# Patient Record
Sex: MALE | Employment: OTHER | URBAN - METROPOLITAN AREA
[De-identification: names, ages, dates, MRNs, and addresses within clinical notes are randomized per-mention and may not be internally consistent; named-entity substitution may affect disease eponyms.]

---

## 2024-10-14 ENCOUNTER — OFFICE VISIT (OUTPATIENT)
Dept: URGENT CARE | Age: 74
End: 2024-10-14

## 2024-10-14 VITALS
HEIGHT: 68 IN | DIASTOLIC BLOOD PRESSURE: 80 MMHG | HEART RATE: 81 BPM | OXYGEN SATURATION: 95 % | TEMPERATURE: 98 F | BODY MASS INDEX: 25.61 KG/M2 | SYSTOLIC BLOOD PRESSURE: 126 MMHG | WEIGHT: 169 LBS

## 2024-10-14 DIAGNOSIS — T63.441A BEE STING REACTION, ACCIDENTAL OR UNINTENTIONAL, INITIAL ENCOUNTER: ICD-10-CM

## 2024-10-14 DIAGNOSIS — T78.40XA ALLERGIC REACTION, INITIAL ENCOUNTER: Primary | ICD-10-CM

## 2024-10-14 RX ORDER — DEXAMETHASONE SODIUM PHOSPHATE 4 MG/ML
10 INJECTION, SOLUTION INTRA-ARTICULAR; INTRALESIONAL; INTRAMUSCULAR; INTRAVENOUS; SOFT TISSUE ONCE
Status: COMPLETED | OUTPATIENT
Start: 2024-10-14 | End: 2024-10-14

## 2024-10-14 RX ORDER — OMEPRAZOLE 10 MG/1
10 CAPSULE, DELAYED RELEASE ORAL DAILY
COMMUNITY

## 2024-10-14 RX ORDER — PREDNISONE 20 MG/1
40 TABLET ORAL DAILY
Qty: 10 TABLET | Refills: 0 | Status: SHIPPED | OUTPATIENT
Start: 2024-10-14 | End: 2024-10-19

## 2024-10-14 RX ORDER — ATORVASTATIN CALCIUM 10 MG/1
20 TABLET, FILM COATED ORAL DAILY
COMMUNITY

## 2024-10-14 RX ADMIN — DEXAMETHASONE SODIUM PHOSPHATE 10 MG: 4 INJECTION, SOLUTION INTRA-ARTICULAR; INTRALESIONAL; INTRAMUSCULAR; INTRAVENOUS; SOFT TISSUE at 12:19

## 2024-10-14 NOTE — PROGRESS NOTES
Nam Haywood (:  1950) is a 74 y.o. male,  here for evaluation of the following chief complaint(s): Insect Bite (Swollen lip. Pt was stung on his lip yesterday around 4:20pm)      Nam Haywood is a New patient.   Last Urgent Care Visit: Visit date not found  I have reviewed the patient's medications; see Medication Reconciliation.    ASSESSMENT/PLAN:  Diagnosis:     ICD-10-CM    1. Allergic reaction, initial encounter  T78.40XA dexAMETHasone (DECADRON) injection 10 mg      2. Bee sting reaction, accidental or unintentional, initial encounter  T63.441A              Medical Decision Making:    Patient was seen at Urgent Care today for right lower lip edema and facial edema after bee sting yesterday afternoon. He felt the sting and witnessed the bee. He also is not on any ACE inhibitors. I do not have concern for angio-edema and this does appear to be localized allergic reaction.     Pt is not in any respiratory distress and airway is patent. No evidence of anaphylaxis or airway compromise.     Pt given IM decadron here in clinic.   Prescribed prednisone.   He will continue antihistamines.      Patient was discharged home with follow-up and return precautions.     Patient's initial blood pressure was elevated greater than 120/80. BP was rechecked prior to discharge and is below 140/90. Therefore, referral to PCP is not required at this time.       Results:  No results found for any visits on 10/14/24.       SUBJECTIVE/OBJECTIVE:  HPI:   This is a 74 y.o. male that presents today with complaint of: bee sting to the lower lip since yesterday.   Pt reports that yesterday afternoon he was drinking a \"sugery\" drink from a can. There was a bee in the can. It stung his lower lip. He saw the bee and felt the sting. He states he \"pulled the mar out\".   He had almost immediate mild to moderate lower lip edema. He has been taking benadryl at home but feels the swelling has worsened.   He denies SOB or difficulty

## 2024-10-14 NOTE — PATIENT INSTRUCTIONS
Take prednisone as prescribed. Begin tomorrow as you received injection today.      Additionally you may take over-the-counter antihistamine such as Claritin or Benadryl (Benadryl causes drowsiness, Claritin is non-drowsy).   If taking Benadryl: Take 25-50mg every 6 hours as directed.   If taking Claritin: You may double the dose for 5 days.     Also take Pepcid 20mg daily x 5 days.     Go to the ER if you develop significant respiratory symptoms such as shortness of breath, difficulty breathing, sensation of throat closing or swelling or other major concerning symptoms.